# Patient Record
Sex: MALE | Race: WHITE | NOT HISPANIC OR LATINO | Employment: OTHER | URBAN - METROPOLITAN AREA
[De-identification: names, ages, dates, MRNs, and addresses within clinical notes are randomized per-mention and may not be internally consistent; named-entity substitution may affect disease eponyms.]

---

## 2022-04-01 ENCOUNTER — IMMUNIZATION (OUTPATIENT)
Dept: PRIMARY CARE CLINIC | Facility: CLINIC | Age: 82
End: 2022-04-01

## 2022-04-01 DIAGNOSIS — Z23 NEED FOR VACCINATION: Primary | ICD-10-CM

## 2022-04-01 PROCEDURE — 91305 COVID-19, MRNA, LNP-S, PF, 30 MCG/0.3 ML DOSE VACCINE (PFIZER): CPT | Mod: PBBFAC | Performed by: INTERNAL MEDICINE

## 2022-07-11 ENCOUNTER — HOSPITAL ENCOUNTER (EMERGENCY)
Facility: HOSPITAL | Age: 82
Discharge: HOME | End: 2022-07-11
Attending: STUDENT IN AN ORGANIZED HEALTH CARE EDUCATION/TRAINING PROGRAM
Payer: COMMERCIAL

## 2022-07-11 VITALS
TEMPERATURE: 100.8 F | HEIGHT: 72 IN | SYSTOLIC BLOOD PRESSURE: 147 MMHG | DIASTOLIC BLOOD PRESSURE: 94 MMHG | RESPIRATION RATE: 18 BRPM | HEART RATE: 113 BPM | WEIGHT: 185 LBS | BODY MASS INDEX: 25.06 KG/M2 | OXYGEN SATURATION: 92 %

## 2022-07-11 DIAGNOSIS — Z79.01 CHRONIC ANTICOAGULATION: ICD-10-CM

## 2022-07-11 DIAGNOSIS — U07.1 COVID: Primary | ICD-10-CM

## 2022-07-11 DIAGNOSIS — I48.20 ATRIAL FIBRILLATION, CHRONIC (CMS/HCC): ICD-10-CM

## 2022-07-11 PROCEDURE — 99284 EMERGENCY DEPT VISIT MOD MDM: CPT | Performed by: STUDENT IN AN ORGANIZED HEALTH CARE EDUCATION/TRAINING PROGRAM

## 2022-07-11 PROCEDURE — 6370000100 HC RX 637 (ALT 250 FOR IP): Mod: AY | Performed by: STUDENT IN AN ORGANIZED HEALTH CARE EDUCATION/TRAINING PROGRAM

## 2022-07-11 PROCEDURE — 99283 EMERGENCY DEPT VISIT LOW MDM: CPT | Performed by: STUDENT IN AN ORGANIZED HEALTH CARE EDUCATION/TRAINING PROGRAM

## 2022-07-11 RX ORDER — SIMVASTATIN 40 MG/1
20 TABLET, FILM COATED ORAL NIGHTLY
COMMUNITY

## 2022-07-11 RX ORDER — WARFARIN SODIUM 5 MG/1
0.5 TABLET ORAL
COMMUNITY

## 2022-07-11 RX ORDER — ALBUTEROL SULFATE 90 UG/1
2 INHALANT RESPIRATORY (INHALATION) EVERY 4 HOURS PRN
Status: DISCONTINUED | OUTPATIENT
Start: 2022-07-11 | End: 2022-07-11 | Stop reason: HOSPADM

## 2022-07-11 RX ADMIN — ALBUTEROL SULFATE 2 PUFF: 90 AEROSOL, METERED RESPIRATORY (INHALATION) at 17:44

## 2022-07-11 ASSESSMENT — ENCOUNTER SYMPTOMS
FEVER: 1
DIARRHEA: 0
SHORTNESS OF BREATH: 0
DYSURIA: 0
RHINORRHEA: 0
APPETITE CHANGE: 0
CONSTIPATION: 0
CHILLS: 0
NAUSEA: 0
VOMITING: 0
COUGH: 1
HEADACHES: 1

## 2022-07-11 NOTE — DISCHARGE INSTRUCTIONS
You can use the albuterol inhaler every 4 hours as needed for wheezing or shortness of breath.  Follow-up if you have worsening shortness of breath.  Because of the medication that you are on for your A. fib you need to not take your Simvistatin for 7 days, and get your INR checked on Friday. Monitor for signs of bleeding and come in to get evaluated if you notice bleeding.  You can take Tylenol up to 4000 mg a day to help with the fever and headache.

## 2022-07-11 NOTE — ED PROVIDER NOTES
CC:  Chief Complaint   Patient presents with   • Covid-19     Patient arrives to ER with (+)COVID test. Pt states sympotms of fever, headache, and cough. Patient requesting antiviral     HPI:  Osito Andre is a 81 y.o. male presenting with the above chief complaint.    Patient presents to the ED via private vehicle with chief complaint of positive home COVID test.  He reports that he has been having some fever, headache, and a mild cough that started yesterday.  He and his wife had heard from others that there were treatments for COVID if he got started sooner that would help, and that is what they are requesting.    I have reviewed the patient's medical history in detail and updated the computerized patient record:   Tobacco  Allergies  Meds  Problems  Med Hx  Surg Hx  Fam Hx  Soc   Hx        HISTORY:  Active Problem List:  There is no problem list on file for this patient.    Medications:  No current facility-administered medications on file prior to encounter.     Current Outpatient Medications on File Prior to Encounter   Medication Sig   • warfarin (Coumadin) 5 mg tablet Take 0.5 mg by mouth daily   • simvastatin (ZOCOR) 40 mg tablet Take 20 mg by mouth nightly      Allergies:  No Known Allergies  PMH:   Past Medical History:   Diagnosis Date   • A-fib (CMS/HCC) (HCC)      PSHx:  Past Surgical History:   Procedure Laterality Date   • APPENDECTOMY       FH:  History reviewed. No pertinent family history.  SH:  Social History     Tobacco Use   • Smoking status: Never Smoker   • Smokeless tobacco: Never Used   Vaping Use   • Vaping Use: Never used   Substance and Sexual Activity   • Alcohol use: Not Currently   • Drug use: Never   • Sexual activity: Defer      ROS:  Review of Systems   Constitutional: Positive for fever. Negative for appetite change and chills.   HENT: Negative for rhinorrhea.    Respiratory: Positive for cough. Negative for shortness of breath.    Cardiovascular: Negative for chest pain and  leg swelling.   Gastrointestinal: Negative for constipation, diarrhea, nausea and vomiting.   Genitourinary: Negative for dysuria.   Neurological: Positive for headaches.     PE:  ED Triage Vitals   Temp Heart Rate Resp BP SpO2   07/11/22 1701 07/11/22 1701 07/11/22 1701 07/11/22 1701 07/11/22 1701   (!) 38.2 °C (100.8 °F) 113 18 167/90 92 %      Mean BP (mmHg) Temp Source Heart Rate Source Patient Position BP Location   07/11/22 1709 07/11/22 1701 -- -- --   118 Oral         FiO2 (%)       --                Physical Exam  Vitals and nursing note reviewed.   Constitutional:       General: He is not in acute distress.     Appearance: Normal appearance. He is well-developed. He is not ill-appearing.   HENT:      Head: Normocephalic and atraumatic.      Nose: Nose normal. No congestion or rhinorrhea.      Mouth/Throat:      Mouth: Mucous membranes are moist.   Eyes:      Extraocular Movements: Extraocular movements intact.      Conjunctiva/sclera: Conjunctivae normal.      Pupils: Pupils are equal, round, and reactive to light.   Cardiovascular:      Rate and Rhythm: Normal rate and regular rhythm.      Pulses: Normal pulses.      Heart sounds: Normal heart sounds. No murmur heard.  Pulmonary:      Effort: Pulmonary effort is normal. No respiratory distress.      Breath sounds: No stridor. No rhonchi.   Abdominal:      General: Abdomen is flat. Bowel sounds are normal. There is no distension.      Palpations: Abdomen is soft.      Tenderness: There is no abdominal tenderness. There is no guarding or rebound.   Musculoskeletal:      Cervical back: Neck supple.      Right lower leg: No edema.      Left lower leg: No edema.   Skin:     General: Skin is warm and dry.      Capillary Refill: Capillary refill takes less than 2 seconds.   Neurological:      General: No focal deficit present.      Mental Status: He is alert and oriented to person, place, and time.   Psychiatric:         Mood and Affect: Mood normal.          Behavior: Behavior normal.         Thought Content: Thought content normal.         Judgment: Judgment normal.       ED LABS:  Labs Reviewed - No data to display    ED IMAGES:  No orders to display     ED PROCEDURES:  Procedures      ED MEDS:  Medications   albuterol HFA 90 mcg/actuation inhaler 2 puff (2 puffs inhalation Given 7/11/22 3975)   nirmatrelvir-ritonavir (PAXLOVID) 150 mg x 2- 100 mg Disp: #30 tablet - ED DOSE PACK 1 Package (1 Package oral ED take home pack 7/11/22 9129)     ED COURSE:  Osito Andre is a very pleasant 81 y.o. male with past medical history significant for atrial fibrillation chronically anticoagulated on warfarin presenting to the ED for positive home COVID test. Upon arrival to the ED ABCs were attended to, and remained stable throughout.  He did have a few scant wheezes, so I am sending him home with an albuterol inhaler.  He is within the treatment window as well for packs limited, so he is discharged with this.  Recommended that he hold his statin for the next 7 days, and come back in for an INR recheck on 7/15/2022.  Monitor for any signs of bleeding, and if he develops these recommended that he come back in to be reevaluated.  He developed worsening shortness of breath also recommended that he come back in to be reevaluated.  They expressed understanding of this plan.             ASSESSMENT/PLAN/DISCUSSION:   Diagnosis Plan   1. COVID     2. Atrial fibrillation, chronic (CMS/HCC) (HCC)  POCT INR   3. Chronic anticoagulation  POCT INR     Discharge MED LIST:    Current Outpatient Medications:   •  warfarin (Coumadin) 5 mg tablet, Take 0.5 mg by mouth daily, Disp: , Rfl:   •  simvastatin (ZOCOR) 40 mg tablet, Take 20 mg by mouth nightly, Disp: , Rfl:   INSTRUCTIONS:  Discharge Instructions       You can use the albuterol inhaler every 4 hours as needed for wheezing or shortness of breath.  Follow-up if you have worsening shortness of breath.  Because of the medication that you are on  for your A. fib you need to not take your Simvistatin for 7 days, and get your INR checked on Friday. Monitor for signs of bleeding and come in to get evaluated if you notice bleeding.  You can take Tylenol up to 4000 mg a day to help with the fever and headache.        An After Visit Summary was printed and given to the patient.  FOLLOW UP:  No follow-up provider specified.    Electronically signed by: Bianca Neumann DO  Date: 7/11/2022  Time: 7:09 PM  A voice recognition program was used to aid in documentation of this record. Sometimes words are not printed exactly as they were spoken. While efforts were made to carefully edit and correct any inaccuracies, some errors may be present; please take these into context.  Please contact the provider if errors are identified.           Bianca Neumann DO  07/11/22 9926